# Patient Record
Sex: FEMALE | Race: WHITE | Employment: FULL TIME | URBAN - METROPOLITAN AREA
[De-identification: names, ages, dates, MRNs, and addresses within clinical notes are randomized per-mention and may not be internally consistent; named-entity substitution may affect disease eponyms.]

---

## 2021-05-25 ENCOUNTER — RECORDS - HEALTHEAST (OUTPATIENT)
Dept: ADMINISTRATIVE | Facility: CLINIC | Age: 61
End: 2021-05-25

## 2022-07-17 ENCOUNTER — OFFICE VISIT (OUTPATIENT)
Dept: URGENT CARE | Facility: URGENT CARE | Age: 62
End: 2022-07-17
Payer: COMMERCIAL

## 2022-07-17 VITALS
SYSTOLIC BLOOD PRESSURE: 152 MMHG | TEMPERATURE: 98.7 F | DIASTOLIC BLOOD PRESSURE: 90 MMHG | OXYGEN SATURATION: 99 % | HEART RATE: 88 BPM
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
## 2022-07-17 DIAGNOSIS — A69.20 LYME DISEASE: ICD-10-CM

## 2022-07-17 DIAGNOSIS — S30.861A TICK BITE OF ABDOMINAL WALL, INITIAL ENCOUNTER: Primary | ICD-10-CM

## 2022-07-17 PROCEDURE — 86618 LYME DISEASE ANTIBODY: CPT | Performed by: FAMILY MEDICINE

## 2022-07-17 PROCEDURE — 36415 COLL VENOUS BLD VENIPUNCTURE: CPT | Performed by: FAMILY MEDICINE

## 2022-07-17 PROCEDURE — 99203 OFFICE O/P NEW LOW 30 MIN: CPT | Performed by: FAMILY MEDICINE

## 2022-07-17 RX ORDER — DOXYCYCLINE 100 MG/1
100 CAPSULE ORAL 2 TIMES DAILY
Qty: 28 CAPSULE | Refills: 0 | Status: SHIPPED | OUTPATIENT
Start: 2022-07-17 | End: 2022-07-31

## 2022-07-17 ASSESSMENT — PAIN SCALES - GENERAL: PAINLEVEL: NO PAIN (0)

## 2022-07-17 NOTE — PROGRESS NOTES
SUBJECTIVE:  Cathryn Duckworth is a 61 year old female who is here because of a tick bite.   The tick was first noticed on the back 1 weeks. .fname was in [facility] at time of bite.  Previous antibiotic(s) given no    Associated symptoms:  Fever: no noted fevers  Maximum temperature   Other symptoms:   Headache no  Sore throat no  Fatigue no  Nausea/vomiting no  Muscle aches no  Joint pain no  Swollen lymph glands no  Stiff neck no  Other     ROS:  negative    OBJECTIVE:  Nontoxic female in no acute distress.  Blood pressure (!) 152/90, pulse 88, temperature 98.7  F (37.1  C), temperature source Tympanic, SpO2 99 %, not currently breastfeeding.  Rash description:     Location: back     Distribution: localized     Lesion grouping: annular and single patch     Color: red     Maximum diameter  4cm.    PERTINENT EXAM: SKIN: no ulcers, lesions or rash, circular rash on back target form    ASSESSMENT / IMPRESSION:  Tick Bite  Target rash    PLAN: lyme testing.  Start Doxycycline. Report back any chills, fever, significant skin rashes, myalgis or other acute symptoms. Otherwise, follow up as needed.

## 2022-07-18 LAB — B BURGDOR IGG+IGM SER QL: 0.11

## 2022-07-19 ENCOUNTER — TELEPHONE (OUTPATIENT)
Dept: URGENT CARE | Facility: URGENT CARE | Age: 62
End: 2022-07-19

## 2022-07-19 NOTE — TELEPHONE ENCOUNTER
Reason for Call:  Other call back    Detailed comments: patient is returning call from  care team, no encounter,  tried [place] back line but n/a. Please call patient back. Thank you.    Phone Number Patient can be reached at: Home number on file [phone] (home)    Best Time:     Can we leave a detailed message on this number? NO    Call taken on 7/19/2022 at 11:42 AM by Katia Yoon